# Patient Record
Sex: FEMALE | Race: WHITE | Employment: UNEMPLOYED | URBAN - METROPOLITAN AREA
[De-identification: names, ages, dates, MRNs, and addresses within clinical notes are randomized per-mention and may not be internally consistent; named-entity substitution may affect disease eponyms.]

---

## 2019-10-24 PROBLEM — F32.A ANXIETY AND DEPRESSION: Status: ACTIVE | Noted: 2019-10-24

## 2019-10-24 PROBLEM — Z87.891 FORMER SMOKER: Status: ACTIVE | Noted: 2019-10-24

## 2019-10-24 PROBLEM — Z34.90 PREGNANCY: Status: ACTIVE | Noted: 2019-10-24

## 2019-10-24 PROBLEM — G43.909 MIGRAINES: Status: ACTIVE | Noted: 2019-10-24

## 2019-10-24 PROBLEM — F41.9 ANXIETY AND DEPRESSION: Status: ACTIVE | Noted: 2019-10-24

## 2020-03-28 ENCOUNTER — HOSPITAL ENCOUNTER (INPATIENT)
Age: 30
LOS: 3 days | Discharge: HOME | DRG: 560 | End: 2020-03-31
Attending: OBSTETRICS & GYNECOLOGY | Admitting: OBSTETRICS & GYNECOLOGY
Payer: MEDICAID

## 2020-03-28 PROBLEM — N89.8 VAGINAL DISCHARGE DURING PREGNANCY IN THIRD TRIMESTER: Status: ACTIVE | Noted: 2020-03-28

## 2020-03-28 PROBLEM — O26.893 VAGINAL DISCHARGE DURING PREGNANCY IN THIRD TRIMESTER: Status: RESOLVED | Noted: 2020-03-28 | Resolved: 2020-03-28

## 2020-03-28 PROBLEM — O26.893 VAGINAL DISCHARGE DURING PREGNANCY IN THIRD TRIMESTER: Status: ACTIVE | Noted: 2020-03-28

## 2020-03-28 PROBLEM — N89.8 VAGINAL DISCHARGE DURING PREGNANCY IN THIRD TRIMESTER: Status: RESOLVED | Noted: 2020-03-28 | Resolved: 2020-03-28

## 2020-03-28 PROBLEM — O42.90 PROM (PREMATURE RUPTURE OF MEMBRANES): Status: ACTIVE | Noted: 2020-03-28

## 2020-03-28 LAB
A1 MICROGLOB PLACENTAL VAG QL: POSITIVE
CONTROL LINE PRESENT?: NORMAL
ERYTHROCYTE [DISTWIDTH] IN BLOOD BY AUTOMATED COUNT: 12.7 % (ref 11.9–14.6)
EXPIRATION DATE: NORMAL
GLUCOSE, GLUUPC: NEGATIVE
HCT VFR BLD AUTO: 40.2 % (ref 35.8–46.3)
HGB BLD-MCNC: 13.6 G/DL (ref 11.7–15.4)
INTERNAL NEGATIVE CONTROL: NORMAL
KETONES UR-MCNC: NEGATIVE MG/DL
KIT LOT NO.: NORMAL
MCH RBC QN AUTO: 30.3 PG (ref 26.1–32.9)
MCHC RBC AUTO-ENTMCNC: 33.8 G/DL (ref 31.4–35)
MCV RBC AUTO: 89.5 FL (ref 79.6–97.8)
NRBC # BLD: 0 K/UL (ref 0–0.2)
PLATELET # BLD AUTO: 236 K/UL (ref 150–450)
PMV BLD AUTO: 10.5 FL (ref 9.4–12.3)
PROT UR QL: NEGATIVE
RBC # BLD AUTO: 4.49 M/UL (ref 4.05–5.2)
WBC # BLD AUTO: 10.8 K/UL (ref 4.3–11.1)

## 2020-03-28 PROCEDURE — 4A1HXCZ MONITORING OF PRODUCTS OF CONCEPTION, CARDIAC RATE, EXTERNAL APPROACH: ICD-10-PCS | Performed by: OBSTETRICS & GYNECOLOGY

## 2020-03-28 PROCEDURE — 85027 COMPLETE CBC AUTOMATED: CPT

## 2020-03-28 PROCEDURE — 99283 EMERGENCY DEPT VISIT LOW MDM: CPT | Performed by: OBSTETRICS & GYNECOLOGY

## 2020-03-28 PROCEDURE — 86900 BLOOD TYPING SEROLOGIC ABO: CPT

## 2020-03-28 PROCEDURE — 65270000029 HC RM PRIVATE

## 2020-03-28 PROCEDURE — 74011250636 HC RX REV CODE- 250/636: Performed by: OBSTETRICS & GYNECOLOGY

## 2020-03-28 PROCEDURE — 84112 EVAL AMNIOTIC FLUID PROTEIN: CPT | Performed by: OBSTETRICS & GYNECOLOGY

## 2020-03-28 PROCEDURE — 81002 URINALYSIS NONAUTO W/O SCOPE: CPT | Performed by: OBSTETRICS & GYNECOLOGY

## 2020-03-28 RX ORDER — SODIUM CHLORIDE 0.9 % (FLUSH) 0.9 %
5-40 SYRINGE (ML) INJECTION EVERY 8 HOURS
Status: DISCONTINUED | OUTPATIENT
Start: 2020-03-29 | End: 2020-03-29 | Stop reason: HOSPADM

## 2020-03-28 RX ORDER — MINERAL OIL
120 OIL (ML) ORAL
Status: COMPLETED | OUTPATIENT
Start: 2020-03-28 | End: 2020-03-29

## 2020-03-28 RX ORDER — BUTORPHANOL TARTRATE 2 MG/ML
1 INJECTION INTRAMUSCULAR; INTRAVENOUS
Status: DISCONTINUED | OUTPATIENT
Start: 2020-03-28 | End: 2020-03-29 | Stop reason: HOSPADM

## 2020-03-28 RX ORDER — OXYTOCIN/RINGER'S LACTATE 30/500 ML
250 PLASTIC BAG, INJECTION (ML) INTRAVENOUS ONCE
Status: DISPENSED | OUTPATIENT
Start: 2020-03-29 | End: 2020-03-29

## 2020-03-28 RX ORDER — LIDOCAINE HYDROCHLORIDE 20 MG/ML
JELLY TOPICAL
Status: DISCONTINUED | OUTPATIENT
Start: 2020-03-28 | End: 2020-03-29 | Stop reason: HOSPADM

## 2020-03-28 RX ORDER — ONDANSETRON 2 MG/ML
4 INJECTION INTRAMUSCULAR; INTRAVENOUS
Status: DISCONTINUED | OUTPATIENT
Start: 2020-03-28 | End: 2020-03-29 | Stop reason: HOSPADM

## 2020-03-28 RX ORDER — DEXTROSE, SODIUM CHLORIDE, SODIUM LACTATE, POTASSIUM CHLORIDE, AND CALCIUM CHLORIDE 5; .6; .31; .03; .02 G/100ML; G/100ML; G/100ML; G/100ML; G/100ML
125 INJECTION, SOLUTION INTRAVENOUS CONTINUOUS
Status: DISCONTINUED | OUTPATIENT
Start: 2020-03-29 | End: 2020-03-29 | Stop reason: HOSPADM

## 2020-03-28 RX ORDER — LIDOCAINE HYDROCHLORIDE 10 MG/ML
1 INJECTION INFILTRATION; PERINEURAL
Status: DISCONTINUED | OUTPATIENT
Start: 2020-03-28 | End: 2020-03-29 | Stop reason: HOSPADM

## 2020-03-28 RX ORDER — SODIUM CHLORIDE 0.9 % (FLUSH) 0.9 %
5-40 SYRINGE (ML) INJECTION AS NEEDED
Status: DISCONTINUED | OUTPATIENT
Start: 2020-03-28 | End: 2020-03-29 | Stop reason: HOSPADM

## 2020-03-28 RX ORDER — OXYTOCIN/RINGER'S LACTATE 30/500 ML
1-25 PLASTIC BAG, INJECTION (ML) INTRAVENOUS
Status: DISCONTINUED | OUTPATIENT
Start: 2020-03-29 | End: 2020-03-29

## 2020-03-28 RX ADMIN — SODIUM CHLORIDE, SODIUM LACTATE, POTASSIUM CHLORIDE, CALCIUM CHLORIDE, AND DEXTROSE MONOHYDRATE 125 ML/HR: 600; 310; 30; 20; 5 INJECTION, SOLUTION INTRAVENOUS at 23:45

## 2020-03-29 ENCOUNTER — ANESTHESIA EVENT (OUTPATIENT)
Dept: LABOR AND DELIVERY | Age: 30
DRG: 560 | End: 2020-03-29
Payer: MEDICAID

## 2020-03-29 ENCOUNTER — ANESTHESIA (OUTPATIENT)
Dept: LABOR AND DELIVERY | Age: 30
DRG: 560 | End: 2020-03-29
Payer: MEDICAID

## 2020-03-29 LAB
ABO + RH BLD: NORMAL
ARTERIAL PATENCY WRIST A: ABNORMAL
ARTERIAL PATENCY WRIST A: ABNORMAL
BASE DEFICIT BLD-SCNC: 5 MMOL/L
BASE DEFICIT BLDV-SCNC: 4 MMOL/L
BDY SITE: ABNORMAL
BDY SITE: ABNORMAL
BLOOD GROUP ANTIBODIES SERPL: NORMAL
CO2 BLD-SCNC: 24 MMOL/L
CO2 BLD-SCNC: 26 MMOL/L
COLLECT TIME,HTIME: 1438
COLLECT TIME,HTIME: 1438
GAS FLOW.O2 O2 DELIVERY SYS: ABNORMAL L/MIN
GAS FLOW.O2 O2 DELIVERY SYS: ABNORMAL L/MIN
HCO3 BLD-SCNC: 24 MMOL/L (ref 22–26)
HCO3 BLDV-SCNC: 22.8 MMOL/L (ref 23–28)
O2/TOTAL GAS SETTING VFR VENT: 21 %
O2/TOTAL GAS SETTING VFR VENT: 21 %
PCO2 BLD: 60.6 MMHG (ref 35–45)
PCO2 BLDV: 46.3 MMHG (ref 41–51)
PH BLD: 7.21 [PH] (ref 7.35–7.45)
PH BLDV: 7.3 [PH] (ref 7.32–7.42)
PO2 BLD: 17 MMHG (ref 75–100)
PO2 BLDV: 23 MMHG
SAO2 % BLD: 17 % (ref 95–98)
SAO2 % BLDV: 33 % (ref 65–88)
SERVICE CMNT-IMP: ABNORMAL
SERVICE CMNT-IMP: ABNORMAL
SPECIMEN EXP DATE BLD: NORMAL
SPECIMEN TYPE: ABNORMAL
SPECIMEN TYPE: ABNORMAL

## 2020-03-29 PROCEDURE — 82803 BLOOD GASES ANY COMBINATION: CPT

## 2020-03-29 PROCEDURE — 75410000000 HC DELIVERY VAGINAL/SINGLE

## 2020-03-29 PROCEDURE — 36415 COLL VENOUS BLD VENIPUNCTURE: CPT

## 2020-03-29 PROCEDURE — 75410000002 HC LABOR FEE PER 1 HR

## 2020-03-29 PROCEDURE — 77030014125 HC TY EPDRL BBMI -B: Performed by: ANESTHESIOLOGY

## 2020-03-29 PROCEDURE — 74011250636 HC RX REV CODE- 250/636: Performed by: OBSTETRICS & GYNECOLOGY

## 2020-03-29 PROCEDURE — 0KQM0ZZ REPAIR PERINEUM MUSCLE, OPEN APPROACH: ICD-10-PCS | Performed by: OBSTETRICS & GYNECOLOGY

## 2020-03-29 PROCEDURE — 76060000078 HC EPIDURAL ANESTHESIA

## 2020-03-29 PROCEDURE — 74011000250 HC RX REV CODE- 250: Performed by: ANESTHESIOLOGY

## 2020-03-29 PROCEDURE — 77030011943

## 2020-03-29 PROCEDURE — 74011000250 HC RX REV CODE- 250: Performed by: REGISTERED NURSE

## 2020-03-29 PROCEDURE — 75410000003 HC RECOV DEL/VAG/CSECN EA 0.5 HR

## 2020-03-29 PROCEDURE — 77030002888 HC SUT CHRMC J&J -A

## 2020-03-29 PROCEDURE — 74011250637 HC RX REV CODE- 250/637: Performed by: OBSTETRICS & GYNECOLOGY

## 2020-03-29 PROCEDURE — 65270000029 HC RM PRIVATE

## 2020-03-29 PROCEDURE — 77010026065 HC OXYGEN MINIMUM MEDICAL AIR

## 2020-03-29 PROCEDURE — A4300 CATH IMPL VASC ACCESS PORTAL: HCPCS | Performed by: ANESTHESIOLOGY

## 2020-03-29 PROCEDURE — 74011250637 HC RX REV CODE- 250/637

## 2020-03-29 PROCEDURE — 77030018846 HC SOL IRR STRL H20 ICUM -A

## 2020-03-29 PROCEDURE — 00HU33Z INSERTION OF INFUSION DEVICE INTO SPINAL CANAL, PERCUTANEOUS APPROACH: ICD-10-PCS | Performed by: ANESTHESIOLOGY

## 2020-03-29 PROCEDURE — 74011250636 HC RX REV CODE- 250/636: Performed by: REGISTERED NURSE

## 2020-03-29 PROCEDURE — 77030005518 HC CATH URETH FOL 2W BARD -B

## 2020-03-29 RX ORDER — ROPIVACAINE HYDROCHLORIDE 2 MG/ML
INJECTION, SOLUTION EPIDURAL; INFILTRATION; PERINEURAL
Status: DISCONTINUED | OUTPATIENT
Start: 2020-03-29 | End: 2020-04-11 | Stop reason: HOSPADM

## 2020-03-29 RX ORDER — MISOPROSTOL 200 UG/1
400 TABLET ORAL ONCE
Status: COMPLETED | OUTPATIENT
Start: 2020-03-29 | End: 2020-03-29

## 2020-03-29 RX ORDER — MISOPROSTOL 200 UG/1
TABLET ORAL
Status: DISCONTINUED
Start: 2020-03-29 | End: 2020-03-29

## 2020-03-29 RX ORDER — SODIUM CHLORIDE, SODIUM LACTATE, POTASSIUM CHLORIDE, CALCIUM CHLORIDE 600; 310; 30; 20 MG/100ML; MG/100ML; MG/100ML; MG/100ML
150 INJECTION, SOLUTION INTRAVENOUS AS NEEDED
Status: DISCONTINUED | OUTPATIENT
Start: 2020-03-29 | End: 2020-03-29

## 2020-03-29 RX ORDER — METHYLERGONOVINE MALEATE 0.2 MG/1
200 TABLET ORAL EVERY 6 HOURS
Status: COMPLETED | OUTPATIENT
Start: 2020-03-29 | End: 2020-03-30

## 2020-03-29 RX ORDER — METHYLERGONOVINE MALEATE 0.2 MG/ML
0.2 INJECTION INTRAVENOUS ONCE
Status: COMPLETED | OUTPATIENT
Start: 2020-03-29 | End: 2020-03-29

## 2020-03-29 RX ORDER — EPHEDRINE SULFATE/0.9% NACL/PF 50 MG/5 ML
SYRINGE (ML) INTRAVENOUS AS NEEDED
Status: DISCONTINUED | OUTPATIENT
Start: 2020-03-29 | End: 2020-04-11 | Stop reason: HOSPADM

## 2020-03-29 RX ORDER — LIDOCAINE HYDROCHLORIDE AND EPINEPHRINE 15; 5 MG/ML; UG/ML
INJECTION, SOLUTION EPIDURAL
Status: COMPLETED | OUTPATIENT
Start: 2020-03-29 | End: 2020-03-29

## 2020-03-29 RX ORDER — MISOPROSTOL 200 UG/1
TABLET ORAL
Status: COMPLETED
Start: 2020-03-29 | End: 2020-03-29

## 2020-03-29 RX ORDER — IBUPROFEN 800 MG/1
800 TABLET ORAL
Status: DISCONTINUED | OUTPATIENT
Start: 2020-03-29 | End: 2020-03-31 | Stop reason: HOSPADM

## 2020-03-29 RX ORDER — SIMETHICONE 80 MG
80 TABLET,CHEWABLE ORAL
Status: DISCONTINUED | OUTPATIENT
Start: 2020-03-29 | End: 2020-03-31 | Stop reason: HOSPADM

## 2020-03-29 RX ORDER — METHYLERGONOVINE MALEATE 0.2 MG/ML
INJECTION INTRAVENOUS
Status: DISCONTINUED
Start: 2020-03-29 | End: 2020-03-29

## 2020-03-29 RX ORDER — HYDROCODONE BITARTRATE AND ACETAMINOPHEN 5; 325 MG/1; MG/1
1 TABLET ORAL
Status: DISCONTINUED | OUTPATIENT
Start: 2020-03-29 | End: 2020-03-31 | Stop reason: HOSPADM

## 2020-03-29 RX ORDER — MISOPROSTOL 200 UG/1
200 TABLET ORAL EVERY 6 HOURS
Status: COMPLETED | OUTPATIENT
Start: 2020-03-29 | End: 2020-03-30

## 2020-03-29 RX ADMIN — SODIUM CHLORIDE, SODIUM LACTATE, POTASSIUM CHLORIDE, AND CALCIUM CHLORIDE 500 ML: 600; 310; 30; 20 INJECTION, SOLUTION INTRAVENOUS at 07:30

## 2020-03-29 RX ADMIN — SODIUM CHLORIDE, SODIUM LACTATE, POTASSIUM CHLORIDE, AND CALCIUM CHLORIDE 150 ML/HR: 600; 310; 30; 20 INJECTION, SOLUTION INTRAVENOUS at 11:51

## 2020-03-29 RX ADMIN — WITCH HAZEL 1 PAD: 500 SOLUTION RECTAL; TOPICAL at 20:01

## 2020-03-29 RX ADMIN — Medication 25 MG: at 08:35

## 2020-03-29 RX ADMIN — ONDANSETRON 4 MG: 2 INJECTION INTRAMUSCULAR; INTRAVENOUS at 15:22

## 2020-03-29 RX ADMIN — METHYLERGONOVINE MALEATE 0.2 MG: 0.2 INJECTION, SOLUTION INTRAMUSCULAR; INTRAVENOUS at 14:53

## 2020-03-29 RX ADMIN — SODIUM CHLORIDE, SODIUM LACTATE, POTASSIUM CHLORIDE, AND CALCIUM CHLORIDE 150 ML/HR: 600; 310; 30; 20 INJECTION, SOLUTION INTRAVENOUS at 07:56

## 2020-03-29 RX ADMIN — Medication 5 MG: at 08:36

## 2020-03-29 RX ADMIN — BENZOCAINE AND LEVOMENTHOL 1 SPRAY: 200; 5 SPRAY TOPICAL at 18:21

## 2020-03-29 RX ADMIN — Medication 2 MILLI-UNITS/MIN: at 02:50

## 2020-03-29 RX ADMIN — LIDOCAINE HYDROCHLORIDE,EPINEPHRINE BITARTRATE 4 ML: 15; .005 INJECTION, SOLUTION EPIDURAL; INFILTRATION; INTRACAUDAL; PERINEURAL at 07:42

## 2020-03-29 RX ADMIN — MISOPROSTOL 400 MCG: 200 TABLET ORAL at 14:58

## 2020-03-29 RX ADMIN — METHYLERGONOVINE MALEATE 200 MCG: 0.2 TABLET ORAL at 18:22

## 2020-03-29 RX ADMIN — Medication 999 MILLI-UNITS/MIN: at 14:42

## 2020-03-29 RX ADMIN — MISOPROSTOL 200 MCG: 200 TABLET ORAL at 18:22

## 2020-03-29 RX ADMIN — ROPIVACAINE HYDROCHLORIDE 8 ML/HR: 2 INJECTION, SOLUTION EPIDURAL; INFILTRATION at 07:48

## 2020-03-29 RX ADMIN — IBUPROFEN 800 MG: 800 TABLET ORAL at 18:22

## 2020-03-29 RX ADMIN — MINERAL OIL 120 ML: 471.95 OIL ORAL at 15:02

## 2020-03-29 RX ADMIN — SODIUM CHLORIDE, SODIUM LACTATE, POTASSIUM CHLORIDE, CALCIUM CHLORIDE, AND DEXTROSE MONOHYDRATE 125 ML/HR: 600; 310; 30; 20; 5 INJECTION, SOLUTION INTRAVENOUS at 07:29

## 2020-03-29 RX ADMIN — Medication 10 MG: at 07:55

## 2020-03-29 NOTE — PROGRESS NOTES
Pt to room OBED2 for triage with chief complaint of Possible ROM. Assessment begins, EFM and [place] applied to a soft non tender abdomen and tracing well. Dr Alexa Chowdhury called to assess patient.

## 2020-03-29 NOTE — PROGRESS NOTES
Dr. Sherwin Rodrigez at bedside with patient. SVE done, patient 1/80/-2. Amnisure test done and resulted with a positive result for rupture of membranes.

## 2020-03-29 NOTE — PROGRESS NOTES
Dr Jeancarlos Paz on unit, strip reviewed with md. SVE done per md's orders, 1-2/80/-2, md states to start pitocin at this time.

## 2020-03-29 NOTE — PROGRESS NOTES
1300 late decels noted, SVE 9+ cm, pt turned to left side with peanut in place, LRFB given. Dr. Darlyn Stahl at desk.

## 2020-03-29 NOTE — H&P
History & Physical    Name: Ifeoma Estrada MRN: [id number]  SSN: [id number]    YOB: 1990  Age: 34 y.o. Sex: female      Chief c/o: prom  Subjective:     Estimated Date of Delivery: 20  OB History    Para Term  AB Living   1             SAB TAB Ectopic Molar Multiple Live Births                    # Outcome Date GA Lbr Maykel/2nd Weight Sex Delivery Anes PTL Lv   1 Current                Ms. Huber Valadez is admitted with pregnancy at 38w3d for prom. pt reports she lost her mucus plug around 19: 00 tonight and after that she began leaking clear or blood tinged fluid. This has been ongoing since 19: 00. Having just mild contractions. Good fetal movement. Generally not feeling well today with decreased appetite. . Prenatal course was normal. Please see prenatal records for details. Past Medical History:   Diagnosis Date    Anxiety and depression     has been on medication per pt \"a lot\" she remembers cymbalta     Migraines      No past surgical history on file. Social History     Occupational History    Not on file   Tobacco Use    Smoking status: Former Smoker    Smokeless tobacco: Never Used    Tobacco comment: quit 7-8 months ago   Substance and Sexual Activity    Alcohol use: Not Currently     Comment: none since +pt    Drug use: Never    Sexual activity: Yes     Partners: Male     Birth control/protection: None     Family History   Problem Relation Age of Onset    Breast Cancer Paternal Grandfather        No Known Allergies  Prior to Admission medications    Medication Sig Start Date End Date Taking? Authorizing Provider   cetirizine (ZYRTEC) 10 mg tablet Take  by mouth. Yes Provider, Historical   prenatal 47/iron/folate 1/dha (PNV-DHA PO) Take  by mouth. Yes Provider, Historical        Review of Systems: A comprehensive review of systems was negative except for that written in the HPI. - a 12 point review of systems.     Objective:     Vitals:  Vitals:    20 9941 BP: 129/84   Pulse: 91   Resp: 18   Temp: 98.8 °F (37.1 °C)        Physical Exam:  Patient without distress. Heart: Regular rate and rhythm  Lung: clear to auscultation throughout lung fields, no wheezes, no rales, no rhonchi and normal respiratory effort  Back: costovertebral angle tenderness absent  Abdomen: soft, nontender  Fundus: soft and non tender  Perineum: blood present, amniotic fluid present  Cervical Exam: 1 cm dilated    80% effaced    -2 station    Presenting Part: cephalic  Lower Extremities:  - Edema No   - Patellar Reflexes: 2+ bilaterally  Membranes:  Spontaneous Rupture of Membranes; Amniotic Fluid: blood tinged fluid  Fetal Heart Rate: Reactive  Baseline: 135 per minute  Variability: moderate  Accelerations: yes  Decelerations: none  Uterine contractions: irregular, every 2-4 minutes  amnisure- +    Prenatal Labs:   Lab Results   Component Value Date/Time    Rubella, External reactive 08/28/2019    HBsAg, External NR 08/28/2019    HIV, External NR 08/28/2019    Gonorrhea, External negative 10/24/2019    Chlamydia, External negative 10/24/2019    ABO,Rh O positive 08/28/2019         Assessment/Plan:     Principal Problem:    PROM (premature rupture of membranes) (3/28/2020)         Plan: 33 yo G1 at 38w3d with prom. Slight blood tinge. Reactive nst.  Having some mild contractions. Will augment if needed. Group B Strep was negative. Recent ultrasound 7#6 oz.

## 2020-03-29 NOTE — ANESTHESIA PREPROCEDURE EVALUATION
Relevant Problems   No relevant active problems       Anesthetic History   No history of anesthetic complications            Review of Systems / Medical History  Patient summary reviewed and pertinent labs reviewed    Pulmonary  Within defined limits                 Neuro/Psych         Headaches (Migraine headaches) and psychiatric history (Anxiety/depression)     Cardiovascular                  Exercise tolerance: >4 METS     GI/Hepatic/Renal  Within defined limits              Endo/Other  Within defined limits           Other Findings   Comments: IUP at term           Physical Exam    Airway  Mallampati: II  TM Distance: 4 - 6 cm  Neck ROM: normal range of motion   Mouth opening: Normal     Cardiovascular    Rhythm: regular           Dental  No notable dental hx       Pulmonary  Breath sounds clear to auscultation               Abdominal  GI exam deferred       Other Findings            Anesthetic Plan    ASA: 2  Anesthesia type: epidural            Anesthetic plan and risks discussed with: Patient and Spouse      Discussed anesthesia options and risks with patient who wishes to proceed with ROEL for labor analgesia

## 2020-03-29 NOTE — PROGRESS NOTES
18 Dr. Jenna Galaviz called for delivery, set up and tiffanie wash complete. 1438  of viable female wt: 7 lbs, l\", APGAR 8/9.  1442 Placenta delivered, pitocin infusing.

## 2020-03-29 NOTE — PROGRESS NOTES
Pt admitted to room 428 for labor. POC reviewed. IV started, Consents witnessed. Lab work drawn, sent to lab.

## 2020-03-29 NOTE — PROGRESS NOTES
SBAR OUT Report: Mother    Verbal report given to Chapo Eli RN on this patient, who is now being transferred to MIU for routine progression of care. The patient is not wearing a green \"Anesthesia-Duramorph\" band. Report consisted of patient's Situation, Background, Assessment and Recommendations (SBAR). [place] ID bands were compared with the identification form, and verified with the patient and receiving nurse. Information from the SBAR, Procedure Summary and Intake/Output and the Lynd Report was reviewed with the receiving nurse; opportunity for questions and clarification provided.

## 2020-03-29 NOTE — PROGRESS NOTES
SBAR IN Report: Mother    Verbal report received from Sowmya Chan RN (full name & credentials) on this patient, who is now being transferred from L&D (unit) for routine progression of care. The patient is not wearing a green \"Anesthesia-Duramorph\" band. Report consisted of patient's Situation, Background, Assessment and Recommendations (SBAR). [place] ID bands were compared with the identification form, and verified with the patient and transferring nurse. Information from the SBAR, Kardex and Intake/Output and the Sarah Report was reviewed with the transferring nurse; opportunity for questions and clarification provided.

## 2020-03-29 NOTE — L&D DELIVERY NOTE
Delivery Summary    Patient: Jose Easton MRN: [id number]  SSN: [id number]    YOB: 1990  Age: 34 y.o. Sex: female       Head delivered over perineal laceration with delivery of anterior shoulder. Bulb suction of mouth and nose on field. Posterior shoulder and body delivered. Cord clamped and cut and handed placed upon maternal chest.  Placenta expressed with fundus firm and manually explored and noted to be free of placenta. During start of repair, gush of blood noted that soaked vaginal packing. Uterus explored with clot expressed and firm tone noted. 2nd degree perineal laceration repaired using 3-0 chromic with excellent hemostasis. 1st degree left labial repaired in running locked fashion. Mother and baby doing well.  qbl 550cc. Pt given methergine and buccal cytotec 400mcg after repair finished and clot evacuated. Information for the patient's :  Isa Shook [[id number]]       Labor Events:    Labor: No    Steroids: None   Cervical Ripening Date/Time:       Cervical Ripening Type: None   Antibiotics During Labor: No   Rupture Identifier: Sac 1    Rupture Date/Time: 3/28/2020 7:00 PM   Rupture Type: SROM   Amniotic Fluid Volume: Scant    Amniotic Fluid Description: Clear    Amniotic Fluid Odor: None    Induction: None       Induction Date/Time:        Indications for Induction:      Augmentation: Oxytocin   Augmentation Date/Time: 3/29/66354:50 AM   Indications for Augmentation: Prolonged ROM   Labor complications: None; Other (comment)   category 2 fetal heart tracing   Additional complications:        Delivery Events:  Indications For Episiotomy:     Episiotomy: None   Perineal Laceration(s): 2nd   Repaired: Yes   Periurethral Laceration Location:      Repaired:     Labial Laceration Location: left   Repaired: Yes   Sulcal Laceration Location:     Repaired:     Vaginal Laceration Location:     Repaired:     Cervical Laceration Location:     Repaired: Repair Suture: Chromic 3-0   Number of Repair Packets: 1   Estimated Blood Loss (ml):  ml   Quantitative Blood Loss (ml)                Delivery Date: 3/29/2020    Delivery Time: 2:38 PM  Delivery Type: Vaginal, Spontaneous  Sex:  Female    Gestational Age: 38w3d   Delivery Clinician:  Enedina Tellez  Living Status: Living   Delivery Location: L&D            APGARS  One minute Five minutes Ten minutes   Skin color: 0   1        Heart rate: 2   2        Grimace: 2   2        Muscle tone: 2   2        Breathin   2        Totals: 8   9            Presentation: Vertex    Position: Middle Occiput Anterior  Resuscitation Method:  Suctioning-bulb; Tactile Stimulation     Meconium Stained: None      Cord Information: 3 Vessels  Complications: None  Cord around:    Delayed cord clamping? No  Cord clamped date/time:3/29/2020  2:39 PM  Disposition of Cord Blood: Lab    Blood Gases Sent?: Yes    Placenta:  Date/Time: 3/29/2020  2:41 PM  Removal: Expressed      Appearance: Normal     [place] Measurements:  Birth Weight: 6 lb 15.8 oz (3.17 kg)      Birth Length: 1' 8.08\" (0.51 m)      Head Circumference: 1' 0.21\" (0.31 m)      Chest Circumference: 1' 0.8\" (0.325 m)     Abdominal Girth:       Other Providers:   INDIGO TELLEZ;GRACIELA BARRETT;RUPA BARRIGA MELISSA A, Obstetrician;Primary Nurse;Primary [place] Nurse;Scrub Tech           Group B Strep: No results found for: Delos Flakes  Information for the patient's :  Stacy Timkenrick [[id number]]   No results found for: Tellis Moles, PCTDIG, BILI, ABORHEXT, ABORH    Recent Labs     20  1458 20  1455   HCO3I  --  24.0   SO2I  --  17*   IBD  --  5   SPECTI VENOUS BLOOD ARTERIAL   ISITE CORD CORD   IDEV ROOM AIR ROOM AIR   IALLEN NOT APPLICABLE NOT APPLICABLE

## 2020-03-29 NOTE — PROGRESS NOTES
Admission assessment complete as noted. Patient oriented to room and unit. Plan of care reviewed and patient verbalizes understanding. Questions encouraged and answered. Patent encouraged to call for needs or concerns. Safety Teaching reviewed:   1. Hand hygiene prior to handling the infant. 2. Use of bulb syringe. 3. Bracelets with matching numbers are placed on mother and infant  4. An infant security tag  [facility]) is placed on the infant's ankle and monitored  5. All OB nurses wear pink Employee badges - do not give your baby to anyone without proper identification. 6. Never leave the baby alone in the room. 7. The infant should be placed on their back to sleep. on a firm mattress. No toys should be placed in the crib. (safe sleep video offered to view)  8. Never shake the baby (video offered to view)  9. Infant fall prevention - do not sleep with the baby, and place the baby in the crib while ambulating. 8. Mother and Baby Care booklet given to Mother.

## 2020-03-29 NOTE — PROGRESS NOTES
1127 Late decels noted, peanut removed, SVE 8 cm. Pt in repositioned into                 throne position. 1140 lates continue pt turned onto right hip tilt, Dr. Ahmet Garcia notified. 1155 O2 on, pitocin decreased to 8 mu/min, LRFB started. 1206 decels resolved.

## 2020-03-29 NOTE — ANESTHESIA PROCEDURE NOTES
Epidural Block    Start time: 3/29/2020 7:32 AM  End time: 3/29/2020 7:52 AM  Performed by: Diana Lea MD  Authorized by: Diana Lea MD     Pre-Procedure  Indication: labor epidural    Preanesthetic Checklist: patient identified, risks and benefits discussed, anesthesia consent, site marked, patient being monitored, timeout performed and anesthesia consent    Timeout Time: 07:35        Epidural:   Patient position:  Seated  Prep region:  Lumbar  Prep: Chlorhexidine    Location:  L3-4    Needle and Epidural Catheter:   Needle Type:  Tuohy  Needle Gauge:  17 G  Injection Technique:  Loss of resistance using air  Attempts:  1  Catheter Size:  19 G  Catheter at Skin Depth (cm):  10  Depth in Epidural Space (cm):  5  Events: no blood with aspiration, no cerebrospinal fluid with aspiration, no paresthesia and negative aspiration test    Test Dose:  Negative    Assessment:   Catheter Secured:  Tegaderm and tape  Insertion:  Uncomplicated  Patient tolerance:  Patient tolerated the procedure well with no immediate complications

## 2020-03-29 NOTE — PROGRESS NOTES
Frye cath placed, SVE, late decels noted, BP below pt normal baseline, and c/o nausea. Ephedrine given by JUVE Baez.

## 2020-03-29 NOTE — PROGRESS NOTES
Labor Progress Note  Patient seen, fetal heart rate and contraction pattern evaluated, patient examined. Pt with late deceleration. Pt was repositioned, oxygen placed, fluid bolus and pitocin decreased by half. Patient Vitals for the past 1 hrs:   BP Pulse   20 1159 116/68 73   20 1150 101/57 92   20 1131 110/68 96   20 1115 118/71 98       Physical Exam:  Cervical Exam:  8 cm dilated    100% effaced    0 station  Per rn at 1145. Presenting Part: cephalic  Membranes:  Premature Rupture of Membranes; Amniotic Fluid: clear fluid  Uterine Activity: Frequency: Every 2-3 minutes  Fetal Heart Rate: Baseline - 150's with accel and good varibility. Pt with late decel subtle while in room. Assessment/Plan:  Reassuring fetal status, Continue plan for vaginal delivery. Discussed with pt that her baby's heart tones are reassuring but if they become nonreassuring, she may require  with risk of bleeding, infection, damage to nearby organs. Pt is aware and agrees to proceed if necessary.

## 2020-03-29 NOTE — ANESTHESIA POSTPROCEDURE EVALUATION
* No procedures listed *.    epidural    Anesthesia Post Evaluation      Multimodal analgesia: multimodal analgesia used between 6 hours prior to anesthesia start to PACU discharge  Patient location during evaluation: bedside  Patient participation: complete - patient participated  Level of consciousness: awake  Pain management: adequate  Airway patency: patent  Anesthetic complications: no  Cardiovascular status: acceptable and stable  Respiratory status: acceptable and room air  Hydration status: acceptable  Comments: Epidural resolved. Post anesthesia nausea and vomiting:  none      No vitals data found for the desired time range.

## 2020-03-29 NOTE — PROGRESS NOTES
Labor Progress Note - late entry  Patient seen, fetal heart rate and contraction pattern evaluated, patient examined. Pt comfortable with epidural.  Pt with some decel secondary to hypotension. Patient Vitals for the past 1 hrs:   BP Temp Pulse   03/29/20 0929 128/74  78   03/29/20 0914 122/65  82   03/29/20 0859 122/68 97.5 °F (36.4 °C) 79   03/29/20 0846 126/67  85       Physical Exam:  Cervical Exam:  4 cm dilated    100% effaced    -1 station    Presenting Part: cephalic  Membranes:  s/p prom  Uterine Activity: Frequency: Every 2 minutes  Fetal Heart Rate: Reactive    Assessment/Plan:  Reassuring fetal status, Continue plan for vaginal delivery. Pt's monitoring improved with ephedrine.

## 2020-03-30 PROCEDURE — 74011250637 HC RX REV CODE- 250/637: Performed by: OBSTETRICS & GYNECOLOGY

## 2020-03-30 PROCEDURE — 65270000029 HC RM PRIVATE

## 2020-03-30 RX ORDER — IBUPROFEN 800 MG/1
800 TABLET ORAL
Qty: 30 TAB | Refills: 0 | Status: SHIPPED | OUTPATIENT
Start: 2020-03-30

## 2020-03-30 RX ADMIN — IBUPROFEN 800 MG: 800 TABLET ORAL at 13:10

## 2020-03-30 RX ADMIN — METHYLERGONOVINE MALEATE 200 MCG: 0.2 TABLET ORAL at 06:20

## 2020-03-30 RX ADMIN — IBUPROFEN 800 MG: 800 TABLET ORAL at 00:01

## 2020-03-30 RX ADMIN — MISOPROSTOL 200 MCG: 200 TABLET ORAL at 11:45

## 2020-03-30 RX ADMIN — MISOPROSTOL 200 MCG: 200 TABLET ORAL at 00:01

## 2020-03-30 RX ADMIN — MISOPROSTOL 200 MCG: 200 TABLET ORAL at 06:20

## 2020-03-30 RX ADMIN — METHYLERGONOVINE MALEATE 200 MCG: 0.2 TABLET ORAL at 00:01

## 2020-03-30 RX ADMIN — METHYLERGONOVINE MALEATE 200 MCG: 0.2 TABLET ORAL at 11:45

## 2020-03-30 RX ADMIN — IBUPROFEN 800 MG: 800 TABLET ORAL at 06:20

## 2020-03-30 RX ADMIN — IBUPROFEN 800 MG: 800 TABLET ORAL at 19:57

## 2020-03-30 NOTE — PROGRESS NOTES
Shift assessment complete as noted. Patient visiting with family. Questions encouraged and answered. Encouraged to call for needs or concerns. Verbalizes understanding. Pt reports she has voided a second time but did not measure it. New urine collection hat provided and requested pt measure one more void.

## 2020-03-30 NOTE — PROGRESS NOTES
Phone call into patient's room due to social distancing. Introduction made; patient agreeable to continue conversation.  provided education on [facility] Home Visit. (Currently [facility] is completing this visits telephonically due to social distancing). Family would like to receive home visit. Referral will be made at discharge. Patient confirms history of depression/anxiety. She has previously taken medication for these symptoms, but was not on anything during pregnancy. Regarding her moods during pregnancy, patient states \"Things went a lot better than I expected them to go - I felt great. \"  Patient states that she has a strong support system. Patient has a PCP - cannot recall doctor's name. Patient denied any needs from  at this time. Informational packet on  mood disorders (education/resources) given to RN to provide to patient. Patient has this 's contact information should any needs/questions arise.     LESLIE Astorga   [phone]

## 2020-03-30 NOTE — LACTATION NOTE

## 2020-03-30 NOTE — DISCHARGE SUMMARY
Obstetrical Discharge Summary     Name: Mary Beth Fraser MRN: [id number]  SSN: [id number]    YOB: 1990  Age: 34 y.o. Sex: female      Allergies: Patient has no known allergies. Admit Date: 3/28/2020    Discharge Date: 3/30/2020     Admitting Physician: Miguel Preston MD     Attending Physician:  Mine Sheehan DO     * Admission Diagnoses: Vaginal discharge during pregnancy in third trimester [O26.893, N89.8]; PROM (premature rupture of membranes) [O42.90]    * Discharge Diagnoses:   Information for the patient's :  Nando Ren [[id number]]   Delivery of a 6 lb 15.8 oz (3.17 kg) female infant via Vaginal, Spontaneous on 3/29/2020 at 2:38 PM  by Sarita Tellez. Apgars were 8  and 9 . Additional Diagnoses:   Hospital Problems as of 3/30/2020 Date Reviewed: 3/28/2020          Codes Class Noted - Resolved POA    * (Principal) PROM (premature rupture of membranes) ICD-10-CM: O42.90  ICD-9-CM: 658.10  3/28/2020 - Present Unknown        RESOLVED: Vaginal discharge during pregnancy in third trimester ICD-10-CM: O26.893, N89.8  ICD-9-CM: 646.83, 623.5  3/28/2020 - 3/28/2020 Unknown             Lab Results   Component Value Date/Time    ABO/Rh(D) O POSITIVE 2020 11:44 PM    Rubella, External reactive 2019    ABO,Rh O positive 2019      Immunization History   Administered Date(s) Administered    Influenza Vaccine (Quad) PF 2019       * Procedures:   * No surgery found *           * Discharge Condition: good    * Hospital Course: Normal hospital course following the delivery. * Disposition: Home    Discharge Medications:   Current Discharge Medication List      START taking these medications    Details   ibuprofen (MOTRIN) 800 mg tablet Take 1 Tab by mouth every eight (8) hours as needed for Pain.   Qty: 30 Tab, Refills: 0         CONTINUE these medications which have NOT CHANGED    Details   cetirizine (ZYRTEC) 10 mg tablet Take  by mouth.      prenatal 47/iron/folate 1/dha (PNV-DHA PO) Take  by mouth. * Follow-up Care/Patient Instructions:   Activity: No sex for 6 weeks      Follow-up Information     Follow up With Specialties Details Why Contact Info    None    None (395) Patient stated that they have no PCP

## 2020-03-30 NOTE — PROGRESS NOTES
Sitz bath was requested by pt. Taken to her and she was educated on how to use it.  Voiced understanding and states she will do bath in a little while

## 2020-03-30 NOTE — PROGRESS NOTES
Post-Partum Day Number 1 Progress/Discharge Note    Patient doing well post-partum without significant complaint. Voiding without difficulty, normal lochia, positive flatus. Pt desires discharge  Vitals:    Patient Vitals for the past 8 hrs:   BP Temp Pulse Resp SpO2   20 0702 113/68 97.4 °F (36.3 °C) 92 16 96 %     Temp (24hrs), Av.1 °F (36.7 °C), Min:97.4 °F (36.3 °C), Max:99.5 °F (37.5 °C)      Vital signs stable, afebrile. Exam:  Patient without distress. Abdomen soft, fundus firm at level of umbilicus, non tender               Perineum with normal lochia noted. Lower extremities are negative for swelling, cords or tenderness. Lab/Data Review:  Lab results reviewed. For significant abnormal values and values requiring intervention, see assessment and plan. Assessment and Plan:  Patient appears to be having uncomplicated post-partum course. Continue routine perineal care and maternal education. Plan discharge for today with follow up in our office in 6 weeks.

## 2020-03-30 NOTE — LACTATION NOTE
This note was copied from a baby's chart. Lactation visit. First time parents. Baby not yet 24 hours old. Has been latching and feeding well per mom and RN. Several episodes of emesis, good output. Reviewed expectations for first 24 hours. Discussed waking measures as needed. Reviewed waking measures and suck practice. Baby gagged and large emesis of clear fluid and colostrum. Bulb syringe not needed. Burped several times. Active feeding cues post emesis. Feeding cues reviewed with parents. Baby noted to have more anterior attachment of frenulum but does have a good strong suck on assessment with good range of motion. Assisted on both breasts in football hold. Reviewed supportive technique. Small compact breast, everted nipples. Baby able to latch well on both breasts. Stays on well, has a good latch. Nursed well x 20 minutes total. Signs of good latch reviewed with parents. Latching very well. Questions answered.

## 2020-03-31 VITALS
HEART RATE: 76 BPM | OXYGEN SATURATION: 98 % | SYSTOLIC BLOOD PRESSURE: 109 MMHG | DIASTOLIC BLOOD PRESSURE: 67 MMHG | RESPIRATION RATE: 16 BRPM | TEMPERATURE: 97.4 F

## 2020-03-31 PROCEDURE — 74011250637 HC RX REV CODE- 250/637: Performed by: OBSTETRICS & GYNECOLOGY

## 2020-03-31 RX ADMIN — IBUPROFEN 800 MG: 800 TABLET ORAL at 06:50

## 2020-03-31 RX ADMIN — IBUPROFEN 800 MG: 800 TABLET ORAL at 01:17

## 2020-03-31 NOTE — PROGRESS NOTES
03/31/20 0118   Pain Assessment   Pain Scale 1 Numeric (0 - 10)   Pain Intensity 1 4   Pain Location 1 Perineum   Pain Description 1 Aching; Sore   Pain Intervention(s) 1 Medication (see MAR)     PRN Motrin 800mg for pain. Patient does not want any narcotics at this time.

## 2020-03-31 NOTE — PROGRESS NOTES
03/31/20 0651   Pain Assessment   Pain Scale 1 Numeric (0 - 10)   Pain Intensity 1 3   Pain Location 1 Abdomen;Perineum   Pain Description 1 Aching; Sore   Pain Intervention(s) 1 Medication (see MAR)     PRN Motrin 800mg for pain

## 2020-03-31 NOTE — PROGRESS NOTES
Referral made to [facility]  home visit program.    Dillon Marvin [address]   [phone]

## 2020-03-31 NOTE — LACTATION NOTE
This note was copied from a baby's chart. Mom reports feedings going well. Encouraged unwrap to wake for feedings. Discussed insurance pumping if needed and demoed use of mom's Pump in style. Provided straight and curved tip syringes. Suggested mom follow up as a first time mom and exclusive breastfeeder in 1-2 days. Encouraged frequent feeding. Watch output. Call as needed.

## 2020-03-31 NOTE — LACTATION NOTE

## 2020-03-31 NOTE — DISCHARGE INSTRUCTIONS
Patient Education   Patient Education        Vaginal Childbirth: Care Instructions  Your Care Instructions    Your body will slowly heal in the next few weeks. It is easy to get too tired and overwhelmed during the first weeks after your baby is born. Changes in your hormones can shift your mood without warning. You may find it hard to meet the extra demands on your energy and time. Take it easy on yourself. Follow-up care is a key part of your treatment and safety. Be sure to make and go to all appointments, and call your doctor if you are having problems. It's also a good idea to know your test results and keep a list of the medicines you take. How can you care for yourself at home? · Vaginal bleeding and cramps  ? After delivery, you will have a bloody discharge from the vagina. This will turn pink within a week and then white or yellow after about 10 days. It may last for 2 to 4 weeks or longer, until the uterus has healed. Use pads instead of tampons until you stop bleeding. ? Do not worry if you pass some blood clots, as long as they are smaller than a golf ball. If you have a tear or stitches in your vaginal area, change the pad at least every 4 hours to prevent soreness and infection. ? You may have cramps for the first few days after childbirth. These are normal and occur as the uterus shrinks to normal size. Take an over-the-counter pain medicine, such as acetaminophen (Tylenol), ibuprofen (Advil, Motrin), or naproxen (Aleve), for cramps. Read and follow all instructions on the label. Do not take aspirin, because it can cause more bleeding. ? Do not take two or more pain medicines at the same time unless the doctor told you to. Many pain medicines have acetaminophen, which is Tylenol. Too much acetaminophen (Tylenol) can be harmful. · Stitches  ? If you have stitches, they will dissolve on their own and do not need to be removed.  Follow your doctor's instructions for cleaning the stitched area.  ? Put ice or a cold pack on your painful area for 10 to 20 minutes at a time, several times a day, for the first few days. Put a thin cloth between the ice and your skin. ? Sit in a few inches of warm water (sitz bath) 3 times a day and after bowel movements. The warm water helps with pain and itching. If you do not have a tub, a warm shower might help. · Breast fullness  ? Your breasts may overfill (engorge) in the first few days after delivery. To help milk flow and to relieve pain, warm your breasts in the shower or by using warm, moist towels before nursing. ? If you are not nursing, do not put warmth on your breasts or touch your breasts. Wear a tight bra or sports bra and use ice until the fullness goes away. This usually takes 2 to 3 days. ? Put ice or a cold pack on your breast after nursing to reduce swelling and pain. Put a thin cloth between the ice and your skin. · Activity  ? Eat a balanced diet. Do not try to lose weight by cutting calories. Keep taking your prenatal vitamins, or take a multivitamin. ? Get as much rest as you can. Try to take naps when your baby sleeps during the day. ? Get some exercise every day. But do not do any heavy exercise until your doctor says it is okay. ? Wait until you are healed (about 4 to 6 weeks) before you have sexual intercourse. Your doctor will tell you when it is okay to have sex. ? Talk to your doctor about birth control. You can get pregnant even before your period returns. Also, you can get pregnant while you are breastfeeding. · Mental health  ? It is normal to have some sadness, anxiety, sleeplessness, and mood swings after you go home. If you feel upset or hopeless for more than a few days or are having trouble doing the things you need to do, talk to your doctor. · Constipation and hemorrhoids  ? Drink plenty of fluids, enough so that your urine is light yellow or clear like water.  If you have kidney, heart, or liver disease and have to limit fluids, talk with your doctor before you increase the amount of fluids you drink. ? Eat plenty of fiber each day. Have a bran muffin or bran cereal for breakfast, and try eating a piece of fruit for a mid-afternoon snack. ? For painful, itchy hemorrhoids, put ice or a cold pack on the area several times a day for 10 minutes at a time. Follow this by putting a warm compress on the area for another 10 to 20 minutes or by sitting in a shallow, warm bath. When should you call for help? Call  911 anytime you think you may need emergency care. For example, call if:    · You have thoughts of harming yourself, your baby, or another person.     · You passed out (lost consciousness).     · You have chest pain, are short of breath, or cough up blood.     · You have a seizure.    Call your doctor now or seek immediate medical care if:    · You have severe vaginal bleeding.     · You are dizzy or lightheaded, or you feel like you may faint.     · You have a fever.     · You have new or more pain in your belly or pelvis.     · You have symptoms of a blood clot in your leg (called a deep vein thrombosis), such as:  ? Pain in the calf, back of the knee, thigh, or groin. ? Redness and swelling in your leg or groin.     · You have signs of preeclampsia, such as:  ? Sudden swelling of your face, hands, or feet. ? New vision problems (such as dimness, blurring, or seeing spots). ? A severe headache.    Watch closely for changes in your health, and be sure to contact your doctor if:    · Your vaginal bleeding seems to be getting heavier.     · You have new or worse vaginal discharge.     · You feel sad, anxious, or hopeless for more than a few days.     · You do not get better as expected. Where can you learn more? Go to http://graham-dale.info/  Enter Q237 in the search box to learn more about \"Vaginal Childbirth: Care Instructions. \"  Current as of: May 29, 2019Content Version: 12.4  © 2457-6651 Healthwise, Incorporated. Care instructions adapted under license by [employer] (which disclaims liability or warranty for this information). If you have questions about a medical condition or this instruction, always ask your healthcare professional. [address] any warranty or liability for your use of this information. After Your Delivery (the Postpartum Period): Care Instructions  Your Care Instructions    Congratulations on the birth of your baby. Like pregnancy, the  period can be a time of excitement, dinesh, and exhaustion. You may look at your wondrous little baby and feel happy. You may also be overwhelmed by your new sleep hours and new responsibilities. At first, babies often sleep during the days and are awake at night. They do not have a pattern or routine. They may make sudden gasps, jerk themselves awake, or look like they have crossed eyes. These are all normal, and they may even make you smile. In these first weeks after delivery, try to take good care of yourself. It may take 4 to 6 weeks to feel like yourself again, and possibly longer if you had a  birth. You will likely feel very tired for several weeks. Your days will be full of ups and downs, but lots of dinesh as well. Follow-up care is a key part of your treatment and safety. Be sure to make and go to all appointments, and call your doctor if you are having problems. It's also a good idea to know your test results and keep a list of the medicines you take. How can you care for yourself at home? Take care of your body after delivery  · Use pads instead of tampons for the bloody flow that may last as long as 2 weeks. · Ease cramps with ibuprofen (Advil, Motrin). · Ease soreness of hemorrhoids and the area between your vagina and rectum with ice compresses or witch hazel pads. · Ease constipation by drinking lots of fluid and eating high-fiber foods.  Ask your doctor about over-the-counter stool softeners. · Cleanse yourself with a gentle squeeze of warm water from a bottle instead of wiping with toilet paper. · Take a sitz bath in warm water several times a day. · Wear a good nursing bra. Ease sore and swollen breasts with warm, wet washcloths. · If you are not breastfeeding, use ice rather than heat for breast soreness. · Your period may not start for several months if you are breastfeeding. You may bleed more, and longer at first, than you did before you got pregnant. · Wait until you are healed (about 4 to 6 weeks) before you have sexual intercourse. Your doctor will tell you when it is okay to have sex. · Try not to travel with your baby for 5 or 6 weeks. If you take a long car trip, make frequent stops to walk around and stretch. Avoid exhaustion  · Rest every day. Try to nap when your baby naps. · Ask another adult to be with you for a few days after delivery. · Plan for  if you have other children. · Stay flexible so you can eat at odd hours and sleep when you need to. Both you and your baby are making new schedules. · Plan small trips to get out of the house. Change can make you feel less tired. · Ask for help with housework, cooking, and shopping. Remind yourself that your job is to care for your baby. Know about help for postpartum depression  · \"Baby blues\" are common for the first 1 to 2 weeks after birth. You may cry or feel sad or irritable for no reason. · Rest whenever you can. Being tired makes it harder to handle your emotions. · Go for walks with your baby. · Talk to your partner, friends, and family about your feelings. · If your symptoms last for more than a few weeks, or if you feel very depressed, ask your doctor for help. · Postpartum depression can be treated. Support groups and counseling can help. Sometimes medicine can also help. Stay healthy  · Eat healthy foods so you have more energy and lose extra baby pounds.   · If you breastfeed, avoid drugs. If you quit smoking during pregnancy, try to stay smoke-free. If you choose to have a drink now and then, have only one drink, and limit the number of occasions that you have a drink. Wait to breastfeed at least 2 hours after you have a drink to reduce the amount of alcohol the baby may get in the milk. · Start daily exercise after 4 to 6 weeks, but rest when you feel tired. · Learn exercises to tone your belly. Do Kegel exercises to regain strength in your pelvic muscles. You can do these exercises while you stand or sit. ? Squeeze the same muscles you would use to stop your urine. Your belly and thighs should not move. ? Hold the squeeze for 3 seconds, and then relax for 3 seconds. ? Start with 3 seconds. Then add 1 second each week until you are able to squeeze for 10 seconds. ? Repeat the exercise 10 to 15 times for each session. Do three or more sessions each day. · Find a class for new mothers and new babies that has an exercise time. · If you had a  birth, give yourself a bit more time before you exercise, and be careful. When should you call for help? Call  911 anytime you think you may need emergency care. For example, call if:    · You have thoughts of harming yourself, your baby, or another person.     · You passed out (lost consciousness).     · You have chest pain, are short of breath, or cough up blood.     · You have a seizure.    Call your doctor now or seek immediate medical care if:    · You have severe vaginal bleeding. This means you are passing blood clots and soaking through a pad each hour for 2 or more hours.     · You are dizzy or lightheaded, or you feel like you may faint.     · You have a fever.     · You have new or more belly pain.     · You have signs of a blood clot in your leg (called a deep vein thrombosis), such as:  ? Pain in the calf, back of the knee, thigh, or groin. ?  Redness and swelling in your leg or groin.     · You have signs of preeclampsia, such as:  ? Sudden swelling of your face, hands, or feet. ? New vision problems (such as dimness, blurring, or seeing spots). ? A severe headache.    Watch closely for changes in your health, and be sure to contact your doctor if:    · Your vaginal bleeding seems to be getting heavier.     · You have new or worse vaginal discharge.     · You feel sad, anxious, or hopeless for more than a few days.     · You do not get better as expected. Where can you learn more? Go to http://graham-dale.info/  Enter A461 in the search box to learn more about \"After Your Delivery (the Postpartum Period): Care Instructions. \"  Current as of: May 29, 2019Content Version: 12.4  © 2988-0137 Healthwise, Incorporated. Care instructions adapted under license by [employer] (which disclaims liability or warranty for this information). If you have questions about a medical condition or this instruction, always ask your healthcare professional. [place] [address] any warranty or liability for your use of this information.

## 2020-03-31 NOTE — PROGRESS NOTES
03/30/20 1957   Pain Assessment   Pain Scale 1 Numeric (0 - 10)   Pain Intensity 1 3   Pain Location 1 Abdomen;Perineum   Pain Description 1 Aching; Sore   Pain Intervention(s) 1 Medication (see MAR)     PRN Motrin 800mg for pain

## 2020-03-31 NOTE — PROGRESS NOTES
Per patient request, patient educated on the sitz bath and sitz bath completed at this time. Patient tolerated well and stated, \"That it helped. \"

## 2020-04-22 ENCOUNTER — HOSPITAL ENCOUNTER (OUTPATIENT)
Dept: LACTATION | Age: 30
Discharge: HOME | End: 2020-04-22
Payer: MEDICAID

## 2020-04-22 PROCEDURE — 99213 OFFICE O/P EST LOW 20 MIN: CPT

## 2020-04-22 NOTE — LACTATION NOTE
2020  Re: Sue Novak  [birth year]29-20)    Dear Dr. Roxane Randle saw Mark Terrazas and her mother Jose Easton in our Lactation office today. Mom came in today with lots of questions about breastfeeding. Date Weight Comments   3-29-20 7-0 Birthweight   3-31-20 6-8 Discharge Weight   20 6-8 At [facility]   20 6-15 At [facility]   20 8-9 Naked At [address] OP Lactation      Feed and Weigh  1st Breast R for 14 min - 26 ml  2nd Breast L for 14 min - 58 ml  Total intake at breast - 84 ml = ~ 2.8 oz     German Ochoa was alert and ready to nurse. Mom was concerned about frequency of feedings and baby seems fussy after nursing. She has great overall weight gain. Mom is purposefully switching baby anytime she comes off the breast and she ends up nursing twice on both sides at each feeding. Discussed changing up pattern to have baby finish first side before switching to decrease initial higher milk spray. Mark Terrazas latched easily and nursed well. She took a full feeding at breast even though she had nursed 2 hours before and mom pumped 1.5 hours before. She was a bit fussy after nursing, but she did calm fairly easily. Encouraged mom to continue with on-demand feeds. Mark Terrazas will be staying some overnights with her Dad, so mom is pumping to provide milk when she is away. Estimated Needs:  Baby needs 21-23 oz of breast milk/formula per day based on 8 feedings per day. Baby needs 75-90 ml/ 2.5-3 oz of breast milk/formula per feeding. The more often baby eats the less volume they need per feeding. If she eats 10 times per day she may only need 2-2.5 oz per day. Plan:  Continue with on-demand feeding. Finish first side, before switching over. If she comes off in less than 12 minutes on the first side, put her back on to finish. If fussiness continues, discuss with Ped. Encouraged upright positioning after feeding. Follow weight gain and pump as needed for storage.      Follow-up:   To Ped 5-19-20. Will call Tuesday 4-28-20. Call as needed before.     Sincerely,      Stan Bertrand [address], [address] [place] [address], [address] [place] [address]

## 2020-04-22 NOTE — LACTATION NOTE
Outpatient Feeding Plan for Breastfeeding  [phone]  Patient: Alfie Garcia  Gestational Age: 36w 4d  Diagnosis:  V 24.1/Z39.1 Fussy baby  Baby Girl Wilson Jasvir Calixto  Start Time:  1150 (late Check-in)  Stop Time:  1250    Good, active breastfeeding is when baby is alert, tugging the nipple in long, deep pulls, and you can hear swallows every 1-2 sucks. By now Mom's milk should be in. Brief, light or shallow sucks or short rapid sucks without frequent swallows should not be considered a full breastfeeding. 1. Complete the following mouth exercises prior to feeding:  Gum Massage and Non-nutritive Sucking    2. Put the baby to the breast on demand at least 8 times per day for 15-20 minutes per side. Finish first side before offering other side. Use: Breast Compression and Breast Massage    4. For storage, pump for 5-10 minutes after nursing. Try to pump within 15 minutes of breastfeeding. Choose 2 times per day to pump. Be consistent. If using a haaka, put it on the first side once she switches to the second side. 5. If pumping and bottle feeding, give baby 75-90 ml/2.5-3 oz of breast milk/formula and double pump with massage and compression for 15 minutes. Estimated Needs:  Baby needs 21-23 oz of breast milk/formula per day based on 8 feedings per day. Baby needs 75-90 ml/ 2.5-3 oz of breast milk/formula per feeding. The more often baby eats the less volume they need per feeding. If she eats 10 times per day she may only need 2-2.5 oz per day. Date Weight Comments   3-29-20 7-0 Birthweight   3-31-20 6-8 Discharge Weight   4-2-20 6-8 At [facility]   4-6-20 6-15 At [facility]   4-22-20 8-9 Naked At [address] [place] [address] OP Lactation      Average weight gain for the first 3 months is 1oz per day. Minimum weight gain in the first 3 months is 0.5 oz per day. Typically regaining to birth weight by 2 weeks. Date Side Position Time Before Wt. After Wt Total   4-22-20 R Cross cradle  1207 14 min 3896 3922 26 ml     L  1224 7 min   1233   3946* 3968  3960 46 ml  12 ml   58 ml  Total: 84 ml ~2.8 oz   *Diaper Change    Baby last ate at 1000. Stated on L and did both sides twice. Pumped about 10:30 and got 1.5 oz. Switching each time she comes off. Pumping after nursing and getting drops or at most gets 1.5 oz total.  Planning to go overnight with Dad possibly tonight if mom has enough milk pumped. Has taken a couple of bottles while mom napping. Typically feeding every 2 hours. Mostly exclusively nursing. Started pumping last 3 days. Bernarda Sahu was alert and ready to nurse. Mom was concerned about frequency of feedings and baby seems fussy after nursing. She has great overall weight gain. Mom is purposefully switching baby anytime she comes off the breast and she ends up nursing twice on both sides at each feeding. Discussed changing up pattern to have baby finish first side before switching to decrease initial higher milk spray. Bernarda Sahu latched easily and nursed well. She took a full feeding at breast even though she had nursed 2 hours before and mom pumped 1.5 hours before. She was a bit fussy after nursing, but she did calm fairly easily. Encouraged mom to continue with on-demand feeds. Bernarda Sahu will be staying some overnights with her Dad, so mom is pumping to provide milk when she is away. Baby's    Oral Digital Assessment:  Appears to have a short frenulum when crying. Does well with suck. Mostly over gum. Harder to get over gum on index. Mom not sore. Output:  Soaking wets. Yellow/green, runny, seedy, frequent stools. Mom's    Nipples:  Small, well everted. Breasts:  Small, firm. Plan:  Continue with on-demand feeding. Finish first side, before switching over. If she comes off in less than 12 minutes on the first side, put her back on to finish. If fussiness continues, discuss with Ped.   Encouraged upright positioning after feeding. Follow weight gain and pump as needed for storage. Follow-up: To Ped 5-19-20. Will call Tuesday 4-28-20. Call as needed before.

## 2020-08-31 PROBLEM — Z34.90 PREGNANCY: Status: RESOLVED | Noted: 2019-10-24 | Resolved: 2020-08-31

## 2020-08-31 PROBLEM — N89.8 SKIN TAG OF VAGINAL MUCOSA: Status: ACTIVE | Noted: 2020-08-31

## 2020-08-31 PROBLEM — O42.90 PROM (PREMATURE RUPTURE OF MEMBRANES): Status: RESOLVED | Noted: 2020-03-28 | Resolved: 2020-08-31

## 2022-03-18 PROBLEM — Z87.891 FORMER SMOKER: Status: ACTIVE | Noted: 2019-10-24

## 2022-03-19 PROBLEM — G43.909 MIGRAINES: Status: ACTIVE | Noted: 2019-10-24

## 2022-03-19 PROBLEM — F41.9 ANXIETY AND DEPRESSION: Status: ACTIVE | Noted: 2019-10-24

## 2022-03-19 PROBLEM — F32.A ANXIETY AND DEPRESSION: Status: ACTIVE | Noted: 2019-10-24

## 2022-03-19 PROBLEM — N89.8 SKIN TAG OF VAGINAL MUCOSA: Status: ACTIVE | Noted: 2020-08-31

## 2022-07-18 ENCOUNTER — OFFICE VISIT (OUTPATIENT)
Dept: OBGYN CLINIC | Age: 32
End: 2022-07-18
Payer: COMMERCIAL

## 2022-07-18 VITALS
BODY MASS INDEX: 20.01 KG/M2 | SYSTOLIC BLOOD PRESSURE: 98 MMHG | HEIGHT: 64 IN | DIASTOLIC BLOOD PRESSURE: 60 MMHG | WEIGHT: 117.2 LBS

## 2022-07-18 DIAGNOSIS — Z11.8 SCREENING FOR VIRAL AND CHLAMYDIAL DISEASES: ICD-10-CM

## 2022-07-18 DIAGNOSIS — Z11.3 SCREENING EXAMINATION FOR VENEREAL DISEASE: ICD-10-CM

## 2022-07-18 DIAGNOSIS — N89.8 VAGINAL IRRITATION: Primary | ICD-10-CM

## 2022-07-18 DIAGNOSIS — Z11.59 SCREENING FOR VIRAL AND CHLAMYDIAL DISEASES: ICD-10-CM

## 2022-07-18 PROCEDURE — 99214 OFFICE O/P EST MOD 30 MIN: CPT | Performed by: NURSE PRACTITIONER

## 2022-07-18 NOTE — PROGRESS NOTES
The patient is a 29 y.o. Jesus Fernandez who is being seen for vaginal irritation. Pt thought she had BV a few weeks ago and took antibiotics from her mom which did not help her symptoms. [facility] and was given Diflucan which seemed to help some. Notes since yesterday she has been having external vaginal irritation. States she's not having any odor or discharge. Notes moderate dryness. Denies burning/frequency with urination. Denies pain/bleeding with intercourse. Just started her cycle this morning. Denies new partners. Denies new soaps/detergents. HISTORY:    Patient's last menstrual period was 2022. Sexual History:  has sex with males    No current outpatient medications on file prior to visit. No current facility-administered medications on file prior to visit. ROS:  Feeling well. No dyspnea or chest pain on exertion. No abdominal pain, change in bowel habits, black or bloody stools. No urinary tract symptoms. GYN ROS: see above. PHYSICAL EXAM:  Blood pressure 98/60, height 5' 4\" (1.626 m), weight 117 lb 3.2 oz (53.2 kg), last menstrual period 2022. The patient appears well, alert, oriented x 3, in no distress. Lungs are clear. Heart RRR, no murmurs. Abdomen soft without tenderness, guarding, mass or organomegaly. Pelvic: VULVA: normal appearing vulva with no masses, tenderness or lesions, VAGINA: normal appearing vagina with normal color and discharge, no lesions, CERVIX: normal appearing cervix without discharge or lesions. ASSESSMENT:  Encounter Diagnoses   Name Primary?     Vaginal irritation Yes    Screening examination for venereal disease     Screening for viral and chlamydial diseases        PLAN:  All questions answered  NuSwab vaginitis plus  Probiotic  Rephresh ok  Will call pt with results and manage accordingly  Discussed physiologic changes as possible differential also      Orders Placed This Encounter   Procedures Paul Vaginitis Plus (VG+)     Standing Status:   Future     Standing Expiration Date:   7/18/2023           Supervising physician is Dr. Valerio Johnson. Greater than 50% of the 25 minute visit were spent in counseling to the above topics.

## 2022-07-21 LAB
A VAGINAE DNA VAG QL NAA+PROBE: ABNORMAL SCORE
BVAB2 DNA VAG QL NAA+PROBE: ABNORMAL SCORE
C ALBICANS DNA VAG QL NAA+PROBE: POSITIVE
C GLABRATA DNA VAG QL NAA+PROBE: NEGATIVE
C TRACH RRNA SPEC QL NAA+PROBE: NEGATIVE
MEGA1 DNA VAG QL NAA+PROBE: ABNORMAL SCORE
N GONORRHOEA RRNA SPEC QL NAA+PROBE: NEGATIVE
SPECIMEN SOURCE: ABNORMAL
T VAGINALIS RRNA SPEC QL NAA+PROBE: NEGATIVE

## 2022-07-22 RX ORDER — METRONIDAZOLE 500 MG/1
500 TABLET ORAL 2 TIMES DAILY
Qty: 14 TABLET | Refills: 0 | Status: SHIPPED | OUTPATIENT
Start: 2022-07-22 | End: 2022-07-29

## 2022-07-22 RX ORDER — FLUCONAZOLE 150 MG/1
TABLET ORAL
Qty: 2 TABLET | Refills: 0 | Status: SHIPPED | OUTPATIENT
Start: 2022-07-22 | End: 2022-08-02

## 2022-08-02 ENCOUNTER — OFFICE VISIT (OUTPATIENT)
Dept: OBGYN CLINIC | Age: 32
End: 2022-08-02
Payer: COMMERCIAL

## 2022-08-02 VITALS
BODY MASS INDEX: 19.7 KG/M2 | HEIGHT: 64 IN | DIASTOLIC BLOOD PRESSURE: 62 MMHG | SYSTOLIC BLOOD PRESSURE: 104 MMHG | WEIGHT: 115.4 LBS

## 2022-08-02 DIAGNOSIS — B37.31 YEAST VAGINITIS: ICD-10-CM

## 2022-08-02 PROCEDURE — 99214 OFFICE O/P EST MOD 30 MIN: CPT | Performed by: OBSTETRICS & GYNECOLOGY

## 2022-08-02 RX ORDER — FLUCONAZOLE 150 MG/1
150 TABLET ORAL ONCE
Qty: 1 TABLET | Refills: 0 | Status: SHIPPED | OUTPATIENT
Start: 2022-08-02 | End: 2022-08-02

## 2022-08-02 RX ORDER — BUTOCONAZOLE NITRATE 100 MG/5G
1 CREAM VAGINAL ONCE
Qty: 1 G | Refills: 0 | Status: SHIPPED | OUTPATIENT
Start: 2022-08-02 | End: 2022-08-02

## 2022-08-02 NOTE — PROGRESS NOTES
The patient is a 29 y.o. Algis Ahumada who is seen for vaginal itching on 2022. She was treated with Flagyl and diflucan. Symptoms have been present for over a month. She was seen at urgent care before that seeing the nurse practitioner. Took flagyl first then started taking diflucan  Complains of white chunky vaginal discharge. Denies vaginal odor. Patient uses vinegar vaginal douche yesterday and had some bleeding after. 2022 was positive for yeast and BV. HISTORY:      Patient's last menstrual period was 2022. Sexual History:  has sex with males  Contraception:  none  No current outpatient medications on file prior to visit. No current facility-administered medications on file prior to visit. ROS:  Feeling well. No dyspnea or chest pain on exertion. No abdominal pain, change in bowel habits, black or bloody stools. No urinary tract symptoms. GYN ROS: normal menses, no abnormal bleeding, pelvic pain or discharge, no breast pain or new or enlarging lumps on self exam.    PHYSICAL EXAM:  Blood pressure 104/62, height 5' 4\" (1.626 m), weight 115 lb 6.4 oz (52.3 kg), last menstrual period 2022. The patient appears well, alert, oriented x 3, in no distress. Abdomen soft without tenderness, guarding, mass or organomegaly. Pelvic: normal external genitalia, vulva, vagina, cervix, uterus and adnexa, VULVA: normal appearing vulva with no masses, tenderness or lesions, vulvar erythema present , VAGINA: normal appearing vagina with normal color and discharge, no lesions, vaginal erythema present with thick cottage cheees like discharge, vaginal discharge - curd-like, CERVIX: normal appearing cervix without discharge or lesions.     ASSESSMENT:    1. Yeast vaginitis     PLAN:  Try another diflucan and suppository to completely treat the yeast no BV          Dayana Gold MD